# Patient Record
Sex: MALE | Race: WHITE | Employment: OTHER | URBAN - METROPOLITAN AREA
[De-identification: names, ages, dates, MRNs, and addresses within clinical notes are randomized per-mention and may not be internally consistent; named-entity substitution may affect disease eponyms.]

---

## 2021-10-22 ENCOUNTER — OFFICE VISIT (OUTPATIENT)
Dept: ORTHOPEDIC SURGERY | Age: 41
End: 2021-10-22
Payer: MEDICAID

## 2021-10-22 VITALS — TEMPERATURE: 97.6 F | WEIGHT: 194 LBS

## 2021-10-22 DIAGNOSIS — S60.221A CONTUSION OF RIGHT HAND, INITIAL ENCOUNTER: ICD-10-CM

## 2021-10-22 DIAGNOSIS — S63.91XA SPRAIN OF RIGHT HAND, INITIAL ENCOUNTER: ICD-10-CM

## 2021-10-22 DIAGNOSIS — M79.641 HAND PAIN, RIGHT: Primary | ICD-10-CM

## 2021-10-22 PROCEDURE — 99203 OFFICE O/P NEW LOW 30 MIN: CPT | Performed by: SPECIALIST

## 2021-10-22 PROCEDURE — 73130 X-RAY EXAM OF HAND: CPT | Performed by: SPECIALIST

## 2021-10-22 RX ORDER — OMEPRAZOLE 20 MG/1
20 TABLET, DELAYED RELEASE ORAL DAILY
COMMUNITY

## 2021-10-22 NOTE — PROGRESS NOTES
Patient: Alesha Olivo                MRN: [id number]       SSN: [id number]  YOB: 1980        AGE: 38 y.o. SEX: male    PCP: Nir Castro MD  10/22/21    Chief Complaint   Patient presents with    Hand Pain     right     HISTORY:  Alesha Olivo is a 38 y.o. male who is seen for a work related right hand injury. He struck the dorsal aspect of his hand on a piece of fiberglass boat while he was trying to secure some wires on a boat. He feels numbness and tingling and pain at the base of his fifth metacarpal.  The ulnar aspect of his hand became purple after the injury but he did not notice any significant swelling. His hand still feels weak so he can't hold heavy objects. He has also been experiencing right index and middle finger numbness and tingling for the past year. He feels a dorsal shooting pain and numbness in his index finger, middle finger, and dorsal hand when he touches his distal forearm. Pain Assessment  10/22/2021   Location of Pain Hand   Location Modifiers Right   Severity of Pain 3   Quality of Pain Dull   Frequency of Pain Intermittent     Occupation, etc:  Mr. 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 He works on engines. He lives with his terrier named Colleen Hernandes in [facility]. He used to live on a boat for 1.5 years. Mr. Jac Garcia weighs 194 lbs. No results found for: HBA1C, VNU4BHOG, YWH3IMZD, YJM1OZLT  Weight Metrics 10/22/2021   Weight 194 lb       There is no problem list on file for this patient.     REVIEW OF SYSTEMS:    Constitutional Symptoms: Negative   Eyes: Negative   Ears, Nose, Throat and Mouth: Negative   Cardiovascular: Negative   Respiratory: Negative   Genitourinary: Per HPI   Gastrointestinal: Per HPI   Integumentary (Skin and/or Breast): Negative   Musculoskeletal: Per HPI   Endocrine/Rheumatologic: Negative   Neurological: Per HPI   Hematology/Lymphatic: Negative    Allergic/Immunologic: Negative   Phychiatric: Negative    Social History     Socioeconomic History    Marital status: SINGLE     Spouse name: Not on file    Number of children: Not on file    Years of education: Not on file    Highest education level: Not on file   Occupational History    Not on file   Tobacco Use    Smoking status: Former Smoker    Smokeless tobacco: Never Used   Substance and Sexual Activity    Alcohol use: Yes     Alcohol/week: 6.0 standard drinks     Types: 6 Cans of beer per week    Drug use: Yes     Types: Marijuana    Sexual activity: Not on file   Other Topics Concern    Not on file   Social History Narrative    Not on file     Social Determinants of Health     Financial Resource Strain:     Difficulty of Paying Living Expenses:    Food Insecurity:     Worried About Running Out of Food in the Last Year:     [address] in the Last Year:    Transportation Needs:     Lack of Transportation (Medical):  Lack of Transportation (Non-Medical):    Physical Activity:     Days of Exercise per Week:     Minutes of Exercise per Session:    Stress:     Feeling of Stress :    Social Connections:     Frequency of Communication with Friends and Family:     Frequency of Social Gatherings with Friends and Family:     Attends Jainism Services:     Active Member of Clubs or Organizations:     Attends Club or Organization Meetings:     Marital Status:    Intimate Partner Violence:     Fear of Current or Ex-Partner:     Emotionally Abused:     Physically Abused:     Sexually Abused:       No Known Allergies   Current Outpatient Medications   Medication Sig    omeprazole (PriLOSEC OTC) 20 mg tablet Take 20 mg by mouth daily.  infliximab-dyyb (INFLECTRA IV) by IntraVENous route. Patient does not know the dosage     No current facility-administered medications for this visit.       PHYSICAL EXAMINATION:  Visit Vitals  Temp 97.6 °F (36.4 °C)   Wt 194 lb (88 kg)      ORTHO EXAMINATION:  Examination Right Hand Left Hand   Skin Intact Intact   Deformity - -   Swelling mild -   Tenderness + base of the fifth metacarpal -   Finger flexion Full Full   Finger extension Full Full   Sensation Normal Normal   Capillary refill Normal Normal   Heberden's nodes - -   Dupuytren's - -      positive Tinel's sign, sensory branch radial nerve, dorsal aspect of radius       RADIOGRAPHS:  XR RIGHT HAND 10/22/21 [facility]  IMPRESSION:  Three views - No fractures, no joint space narrowing, contusion. IMPRESSION:      ICD-10-CM ICD-9-CM    1. Hand pain, right  M79.641 729.5 AMB POC XRAY, HAND; 3+ VIEWS   2. Contusion of right hand, initial encounter  S60.221A 923.20    3. Sprain of right hand, initial encounter  S63. 91XA 842.10      PLAN:  There is no need for surgery at this time. He will follow up PRN with Dr. Adrienne Ordaz for orthopedic hand surgery consultation.         Scribed by Mark Elizabeth ([address]) as dictated by Chelsie Grossman MD

## 2022-09-02 PROBLEM — K50.10 CROHN'S COLITIS (HCC): Status: ACTIVE | Noted: 2022-09-02
